# Patient Record
Sex: MALE | Race: BLACK OR AFRICAN AMERICAN | NOT HISPANIC OR LATINO | Employment: OTHER | URBAN - METROPOLITAN AREA
[De-identification: names, ages, dates, MRNs, and addresses within clinical notes are randomized per-mention and may not be internally consistent; named-entity substitution may affect disease eponyms.]

---

## 2017-11-06 ENCOUNTER — ESTABLISHED COMPREHENSIVE EXAM (OUTPATIENT)
Dept: URBAN - METROPOLITAN AREA CLINIC 46 | Facility: CLINIC | Age: 57
End: 2017-11-06

## 2017-11-06 DIAGNOSIS — H40.1131: ICD-10-CM

## 2017-11-06 PROCEDURE — 92012 INTRM OPH EXAM EST PATIENT: CPT

## 2017-11-06 PROCEDURE — 92083 EXTENDED VISUAL FIELD XM: CPT

## 2017-11-06 ASSESSMENT — TONOMETRY
OD_IOP_MMHG: 17
OS_IOP_MMHG: 19

## 2017-11-06 ASSESSMENT — VISUAL ACUITY
OD_CC: J6
OS_SC: J6
OD_SC: 20/60
OS_CC: J2
OS_SC: 20/30-1
OD_SC: J10-1

## 2017-12-07 ENCOUNTER — FOLLOW UP (OUTPATIENT)
Dept: URBAN - METROPOLITAN AREA CLINIC 46 | Facility: CLINIC | Age: 57
End: 2017-12-07

## 2017-12-07 DIAGNOSIS — H40.1131: ICD-10-CM

## 2017-12-07 PROCEDURE — 92012 INTRM OPH EXAM EST PATIENT: CPT

## 2017-12-07 ASSESSMENT — TONOMETRY
OS_IOP_MMHG: 20
OD_IOP_MMHG: 18

## 2017-12-07 ASSESSMENT — VISUAL ACUITY
OS_CC: 20/30
OD_CC: 20/50

## 2018-04-04 ENCOUNTER — ESTABLISHED PATIENT (OUTPATIENT)
Dept: URBAN - METROPOLITAN AREA CLINIC 46 | Facility: CLINIC | Age: 58
End: 2018-04-04

## 2018-04-04 DIAGNOSIS — H40.1131: ICD-10-CM

## 2018-04-04 DIAGNOSIS — H52.4: ICD-10-CM

## 2018-04-04 PROCEDURE — 92133 CPTRZD OPH DX IMG PST SGM ON: CPT

## 2018-04-04 PROCEDURE — 92012 INTRM OPH EXAM EST PATIENT: CPT

## 2018-04-04 ASSESSMENT — VISUAL ACUITY
OS_SC: 20/25+1
OS_SC: J8
OD_CC: J8
OD_SC: J10-1
OD_SC: 20/50-1
OS_CC: 20/20-1
OD_CC: 20/50-1
OS_CC: J2

## 2018-04-04 ASSESSMENT — TONOMETRY
OD_IOP_MMHG: 18
OS_IOP_MMHG: 18

## 2018-11-06 ENCOUNTER — ESTABLISHED COMPREHENSIVE EXAM (OUTPATIENT)
Dept: URBAN - METROPOLITAN AREA CLINIC 46 | Facility: CLINIC | Age: 58
End: 2018-11-06

## 2018-11-06 DIAGNOSIS — H52.4: ICD-10-CM

## 2018-11-06 DIAGNOSIS — H40.1131: ICD-10-CM

## 2018-11-06 DIAGNOSIS — H25.9: ICD-10-CM

## 2018-11-06 PROCEDURE — 92014 COMPRE OPH EXAM EST PT 1/>: CPT

## 2018-11-06 PROCEDURE — 92015 DETERMINE REFRACTIVE STATE: CPT

## 2018-11-06 ASSESSMENT — VISUAL ACUITY
OD_CC: J6
OS_PH: 20/30
OS_CC: J2
OD_SC: J<10
OD_BAT: 20/<400
OS_CC: 20/30+2
OD_PH: 20/40
OS_SC: J8
OS_BAT: 20/<400
OD_CC: 20/50+1
OD_SC: 20/50
OS_SC: 20/30

## 2018-11-06 ASSESSMENT — TONOMETRY
OS_IOP_MMHG: 20
OD_IOP_MMHG: 19

## 2019-01-10 ENCOUNTER — ESTABLISHED PATIENT (OUTPATIENT)
Dept: URBAN - METROPOLITAN AREA CLINIC 46 | Facility: CLINIC | Age: 59
End: 2019-01-10

## 2019-01-10 DIAGNOSIS — H40.1131: ICD-10-CM

## 2019-01-10 PROCEDURE — 92012 INTRM OPH EXAM EST PATIENT: CPT

## 2019-01-10 PROCEDURE — 92083 EXTENDED VISUAL FIELD XM: CPT

## 2019-01-10 ASSESSMENT — VISUAL ACUITY
OD_CC: 20/40+1
OS_SC: 20/30+2
OS_CC: 20/25
OD_SC: 20/60+2

## 2019-01-10 ASSESSMENT — TONOMETRY
OD_IOP_MMHG: 19
OS_IOP_MMHG: 18

## 2019-07-10 ENCOUNTER — ESTABLISHED COMPREHENSIVE EXAM (OUTPATIENT)
Dept: URBAN - METROPOLITAN AREA CLINIC 46 | Facility: CLINIC | Age: 59
End: 2019-07-10

## 2019-07-10 DIAGNOSIS — H40.1131: ICD-10-CM

## 2019-07-10 DIAGNOSIS — H25.9: ICD-10-CM

## 2019-07-10 DIAGNOSIS — H53.001: ICD-10-CM

## 2019-07-10 DIAGNOSIS — H52.4: ICD-10-CM

## 2019-07-10 DIAGNOSIS — H52.7: ICD-10-CM

## 2019-07-10 PROCEDURE — 92014 COMPRE OPH EXAM EST PT 1/>: CPT

## 2019-07-10 PROCEDURE — 92133 CPTRZD OPH DX IMG PST SGM ON: CPT

## 2019-07-10 PROCEDURE — 92015 DETERMINE REFRACTIVE STATE: CPT

## 2019-07-10 ASSESSMENT — VISUAL ACUITY
OD_CC: J3
OS_OTHER: N//+50
OS_SC: J>10
OS_CC: J2
OS_SC: 20/30
OD_CC: 20/40-2
OD_SC: 20/60-2
OS_CC: 20/20-2
OD_SC: J10

## 2019-07-10 ASSESSMENT — TONOMETRY
OS_IOP_MMHG: 17
OD_IOP_MMHG: 19

## 2019-10-04 ENCOUNTER — EST. PATIENT EMERGENCY (OUTPATIENT)
Dept: URBAN - METROPOLITAN AREA CLINIC 46 | Facility: CLINIC | Age: 59
End: 2019-10-04

## 2019-10-04 DIAGNOSIS — S05.00XA: ICD-10-CM

## 2019-10-04 DIAGNOSIS — H57.11: ICD-10-CM

## 2019-10-04 PROCEDURE — 92012 INTRM OPH EXAM EST PATIENT: CPT

## 2019-10-04 ASSESSMENT — VISUAL ACUITY
OS_CC: 20/20-1
OD_CC: 20/40-2

## 2019-10-04 ASSESSMENT — TONOMETRY: OS_IOP_MMHG: 17

## 2019-10-07 ENCOUNTER — FOLLOW UP (OUTPATIENT)
Dept: URBAN - METROPOLITAN AREA CLINIC 46 | Facility: CLINIC | Age: 59
End: 2019-10-07

## 2019-10-07 DIAGNOSIS — H57.11: ICD-10-CM

## 2019-10-07 DIAGNOSIS — S05.00XD: ICD-10-CM

## 2019-10-07 PROCEDURE — 92012 INTRM OPH EXAM EST PATIENT: CPT

## 2019-10-07 ASSESSMENT — VISUAL ACUITY
OS_CC: 20/25
OD_CC: 20/50

## 2020-01-13 ENCOUNTER — FOLLOW UP (OUTPATIENT)
Dept: URBAN - METROPOLITAN AREA CLINIC 46 | Facility: CLINIC | Age: 60
End: 2020-01-13

## 2020-01-13 DIAGNOSIS — H57.11: ICD-10-CM

## 2020-01-13 DIAGNOSIS — H40.1131: ICD-10-CM

## 2020-01-13 PROCEDURE — 92012 INTRM OPH EXAM EST PATIENT: CPT

## 2020-01-13 PROCEDURE — 92083 EXTENDED VISUAL FIELD XM: CPT

## 2020-01-13 ASSESSMENT — TONOMETRY
OD_IOP_MMHG: 22
OS_IOP_MMHG: 18

## 2020-01-13 ASSESSMENT — VISUAL ACUITY
OS_CC: 20/25+2
OD_CC: 20/50+2
